# Patient Record
Sex: FEMALE | Race: WHITE | NOT HISPANIC OR LATINO | Employment: FULL TIME | URBAN - NONMETROPOLITAN AREA
[De-identification: names, ages, dates, MRNs, and addresses within clinical notes are randomized per-mention and may not be internally consistent; named-entity substitution may affect disease eponyms.]

---

## 2017-02-24 ENCOUNTER — OFFICE VISIT (OUTPATIENT)
Dept: RETAIL CLINIC | Facility: CLINIC | Age: 27
End: 2017-02-24

## 2017-02-24 VITALS
RESPIRATION RATE: 16 BRPM | SYSTOLIC BLOOD PRESSURE: 108 MMHG | OXYGEN SATURATION: 98 % | WEIGHT: 260.2 LBS | TEMPERATURE: 99.1 F | DIASTOLIC BLOOD PRESSURE: 74 MMHG | HEART RATE: 79 BPM

## 2017-02-24 DIAGNOSIS — J01.10 ACUTE NON-RECURRENT FRONTAL SINUSITIS: ICD-10-CM

## 2017-02-24 DIAGNOSIS — H65.03 BILATERAL ACUTE SEROUS OTITIS MEDIA, RECURRENCE NOT SPECIFIED: Primary | ICD-10-CM

## 2017-02-24 PROCEDURE — 99203 OFFICE O/P NEW LOW 30 MIN: CPT | Performed by: NURSE PRACTITIONER

## 2017-02-24 RX ORDER — AMOXICILLIN 875 MG/1
875 TABLET, COATED ORAL 2 TIMES DAILY
Qty: 20 TABLET | Refills: 0 | Status: SHIPPED | OUTPATIENT
Start: 2017-02-24 | End: 2017-03-06

## 2017-02-24 NOTE — PATIENT INSTRUCTIONS
Sinusitis, Adult    You have been diagnosed with a sinus infection (sinusitis).  Sinusitis is redness, soreness, and inflammation of the paranasal sinuses. Paranasal sinuses are air pockets within the bones of your face. They are located beneath your eyes, in the middle of your forehead, and above your eyes. In healthy paranasal sinuses, mucus is able to drain out, and air is able to circulate through them by way of your nose. However, when your paranasal sinuses are inflamed, mucus and air can become trapped. This can allow bacteria and other germs to grow and cause infection.    Symptoms of sinusitis may include pain and pressure around the affected sinuses, upper toothache, earache, headache, bad breath, decreased sense of smell and taste, cough, fatigue, fever, thick drainage from your nose, which often is green and may contain pus, or swelling and warmth over the affected sinuses.    An antibiotic is prescribed for you today that needs to be taken until gone, whether or not you feel better. It is recommended that you eat yogurt while taking an antibiotic. Over the counter antihistamines such as Zyrtec, Allegra or Claritin can help to dry mucus. Mucinex may help with thinning of post nasal drip and cough. Use Motrin or Tylenol for fever or pain. Increase your intake of fluids, get plenty of rest, use a humidifier or inhale steam 3-4 times a day (for example, sit in the bathroom with the shower running), and apply a warm, moist washcloth to your face 3-4 times a day. You may also use saline nasal spray 2 sprays in each nostril several times a day to help moisten and clean your sinuses.    Follow up with your primary care provider if no improvement after 72 hours, or sooner for any increase in symptoms or concerns.     SEEK IMMEDIATE MEDICAL CARE IF:  · You have increasing pain or severe headaches.  · You have nausea, vomiting, or drowsiness.  · You have swelling around your face.  · You have vision  problems.  · You have a stiff neck.  · You have difficulty breathing.     This information is not intended to replace advice given to you by your health care provider. Make sure you discuss any questions you have with your health care provider.     Pt verbalized understanding, visit summary given.      NANY Wang

## 2017-02-24 NOTE — PROGRESS NOTES
Subjective   Kamla Marti is a 27 y.o. female.     HPI Comments: Patient reports a 5 day history of nasal congestion, headache, fever first 2 days but gone now, sinus pain and pressure worsening, PND and cough. Taking dayquil and nyquil with little relief. Continues to worsen now with thick green nasal drainage with blood at times. Multiple ill contacts at her school which closed today due to illness.     Sinusitis   Associated symptoms include congestion, coughing, ear pain (pressure), headaches, sinus pressure and a sore throat (resolved after first 2 days). Pertinent negatives include no chills.        No Known Allergies      The following portions of the patient's history were reviewed and updated as appropriate: allergies, current medications, past family history, past medical history, past social history, past surgical history and problem list.    Review of Systems   Constitutional: Positive for fatigue and fever. Negative for chills.   HENT: Positive for congestion, ear pain (pressure), postnasal drip, rhinorrhea, sinus pressure and sore throat (resolved after first 2 days). Negative for facial swelling.    Respiratory: Positive for cough. Negative for wheezing.    Neurological: Positive for headaches.       Objective     Visit Vitals   • /74 (Cuff Size: Large Adult)   • Pulse 79   • Temp 99.1 °F (37.3 °C) (Oral)   • Resp 16   • Wt 260 lb 3.2 oz (118 kg)   • LMP 01/29/2017 (Exact Date)   • SpO2 98%       Physical Exam  General Appearance:  Well-appearing, well-developed, well hydrated, well nourished, no acute distress, alert and oriented, appears stated age, comfortable, pleasant, cooperative  Head/face:  Normocephalic, atraumatic with bilateral frontal sinus tenderness with palpation  Eyes:  Pupils equal, sclera clear, EOMI  Ears:  Bilateral TMs are dull gray with minimal light reflex, canals are clear, no pinna or tragus tenderness with palpation  Nose/Sinuses:  Nares are moderately congested  bilaterally  Mouth/Throat:  Posterior pharynx is mildly erythematous with a small amount of clear drainage  Neck:  Supple without lymphadenopathy or thyromegaly; trachea is midline  Lungs:  Clear to auscultation bilaterally  Heart:  Regular rate and rhythm without murmur, gallop or rub  Neurologic:  Alert; no focal deficits; age appropriate behavior and speech      Assessment/Plan   Kamla was seen today for sinusitis.    Diagnoses and all orders for this visit:    Bilateral acute serous otitis media, recurrence not specified    Acute non-recurrent frontal sinusitis    Other orders  -     amoxicillin (AMOXIL) 875 MG tablet; Take 1 tablet by mouth 2 (Two) Times a Day for 10 days.    patient agrees to continue allergy med with decongestant she has at home. Add saline nasal spray. Discussed dosing, side effects, recommended other symptomatic care.  Patient instructions and visit summary given as documented. Patient should follow up with primary care provider if symptoms worsen, fail to resolve or other symptoms need attention. Patient/family agree to the above.         Referred to primary care provider in this area. Given list of providers with [facility] and other local providers taking new patients.              NANY Wang